# Patient Record
Sex: FEMALE | Race: WHITE | NOT HISPANIC OR LATINO | Employment: OTHER | URBAN - METROPOLITAN AREA
[De-identification: names, ages, dates, MRNs, and addresses within clinical notes are randomized per-mention and may not be internally consistent; named-entity substitution may affect disease eponyms.]

---

## 2017-03-27 ENCOUNTER — LAB REQUISITION (OUTPATIENT)
Dept: LAB | Facility: HOSPITAL | Age: 52
End: 2017-03-27

## 2017-03-27 DIAGNOSIS — L03.115 CELLULITIS OF RIGHT LOWER EXTREMITY: ICD-10-CM

## 2017-03-27 LAB
ALBUMIN SERPL-MCNC: 4.4 G/DL (ref 3.2–4.8)
ALBUMIN/GLOB SERPL: 1.6 G/DL (ref 1.5–2.5)
ALP SERPL-CCNC: 57 U/L (ref 25–100)
ALT SERPL W P-5'-P-CCNC: 22 U/L (ref 7–40)
ANION GAP SERPL CALCULATED.3IONS-SCNC: 5 MMOL/L (ref 3–11)
AST SERPL-CCNC: 24 U/L (ref 0–33)
BASOPHILS # BLD AUTO: 0.05 10*3/MM3 (ref 0–0.2)
BASOPHILS NFR BLD AUTO: 0.6 % (ref 0–1)
BILIRUB SERPL-MCNC: 0.4 MG/DL (ref 0.3–1.2)
BUN BLD-MCNC: 12 MG/DL (ref 9–23)
BUN/CREAT SERPL: 17.1 (ref 7–25)
CALCIUM SPEC-SCNC: 9.8 MG/DL (ref 8.7–10.4)
CHLORIDE SERPL-SCNC: 102 MMOL/L (ref 99–109)
CK SERPL-CCNC: 104 U/L (ref 26–174)
CO2 SERPL-SCNC: 31 MMOL/L (ref 20–31)
CREAT BLD-MCNC: 0.7 MG/DL (ref 0.6–1.3)
CRP SERPL-MCNC: 1.8 MG/DL (ref 0–10)
DEPRECATED RDW RBC AUTO: 43.4 FL (ref 37–54)
EOSINOPHIL # BLD AUTO: 0.07 10*3/MM3 (ref 0.1–0.3)
EOSINOPHIL NFR BLD AUTO: 0.8 % (ref 0–3)
ERYTHROCYTE [DISTWIDTH] IN BLOOD BY AUTOMATED COUNT: 12.7 % (ref 11.3–14.5)
ERYTHROCYTE [SEDIMENTATION RATE] IN BLOOD: 7 MM/HR (ref 0–30)
GFR SERPL CREATININE-BSD FRML MDRD: 87 ML/MIN/1.73
GLOBULIN UR ELPH-MCNC: 2.8 GM/DL
GLUCOSE BLD-MCNC: 100 MG/DL (ref 70–100)
HCT VFR BLD AUTO: 44.4 % (ref 34.5–44)
HGB BLD-MCNC: 14.8 G/DL (ref 11.5–15.5)
IMM GRANULOCYTES # BLD: 0.02 10*3/MM3 (ref 0–0.03)
IMM GRANULOCYTES NFR BLD: 0.2 % (ref 0–0.6)
LYMPHOCYTES # BLD AUTO: 2.81 10*3/MM3 (ref 0.6–4.8)
LYMPHOCYTES NFR BLD AUTO: 32.9 % (ref 24–44)
MCH RBC QN AUTO: 31.2 PG (ref 27–31)
MCHC RBC AUTO-ENTMCNC: 33.3 G/DL (ref 32–36)
MCV RBC AUTO: 93.5 FL (ref 80–99)
MONOCYTES # BLD AUTO: 0.89 10*3/MM3 (ref 0–1)
MONOCYTES NFR BLD AUTO: 10.4 % (ref 0–12)
NEUTROPHILS # BLD AUTO: 4.69 10*3/MM3 (ref 1.5–8.3)
NEUTROPHILS NFR BLD AUTO: 55.1 % (ref 41–71)
PLATELET # BLD AUTO: 322 10*3/MM3 (ref 150–450)
PMV BLD AUTO: 9.7 FL (ref 6–12)
POTASSIUM BLD-SCNC: 3.1 MMOL/L (ref 3.5–5.5)
PROT SERPL-MCNC: 7.2 G/DL (ref 5.7–8.2)
RBC # BLD AUTO: 4.75 10*6/MM3 (ref 3.89–5.14)
SODIUM BLD-SCNC: 138 MMOL/L (ref 132–146)
WBC NRBC COR # BLD: 8.53 10*3/MM3 (ref 3.5–10.8)

## 2017-03-27 PROCEDURE — 87070 CULTURE OTHR SPECIMN AEROBIC: CPT | Performed by: INTERNAL MEDICINE

## 2017-03-27 PROCEDURE — 80053 COMPREHEN METABOLIC PANEL: CPT | Performed by: INTERNAL MEDICINE

## 2017-03-27 PROCEDURE — 85652 RBC SED RATE AUTOMATED: CPT | Performed by: INTERNAL MEDICINE

## 2017-03-27 PROCEDURE — 87205 SMEAR GRAM STAIN: CPT | Performed by: INTERNAL MEDICINE

## 2017-03-27 PROCEDURE — 86140 C-REACTIVE PROTEIN: CPT | Performed by: INTERNAL MEDICINE

## 2017-03-27 PROCEDURE — 82550 ASSAY OF CK (CPK): CPT | Performed by: INTERNAL MEDICINE

## 2017-03-27 PROCEDURE — 85025 COMPLETE CBC W/AUTO DIFF WBC: CPT | Performed by: INTERNAL MEDICINE

## 2017-03-28 ENCOUNTER — TRANSCRIBE ORDERS (OUTPATIENT)
Dept: ADMINISTRATIVE | Facility: HOSPITAL | Age: 52
End: 2017-03-28

## 2017-03-28 DIAGNOSIS — L03.115 CELLULITIS OF RIGHT LEG: Primary | ICD-10-CM

## 2017-03-29 LAB
BACTERIA SPEC AEROBE CULT: NORMAL
GRAM STN SPEC: NORMAL

## 2017-03-30 ENCOUNTER — TRANSCRIBE ORDERS (OUTPATIENT)
Dept: LAB | Facility: HOSPITAL | Age: 52
End: 2017-03-30

## 2017-03-30 ENCOUNTER — LAB (OUTPATIENT)
Dept: LAB | Facility: HOSPITAL | Age: 52
End: 2017-03-30
Attending: INTERNAL MEDICINE

## 2017-03-30 ENCOUNTER — TRANSCRIBE ORDERS (OUTPATIENT)
Dept: PHYSICAL THERAPY | Facility: HOSPITAL | Age: 52
End: 2017-03-30

## 2017-03-30 DIAGNOSIS — R19.7 DIARRHEA, UNSPECIFIED TYPE: Primary | ICD-10-CM

## 2017-03-30 DIAGNOSIS — R19.7 DIARRHEA, UNSPECIFIED TYPE: ICD-10-CM

## 2017-03-30 DIAGNOSIS — L03.115 CELLULITIS OF RIGHT LEG: Primary | ICD-10-CM

## 2017-03-30 LAB — C DIFF TOX GENS STL QL NAA+PROBE: NOT DETECTED

## 2017-03-30 PROCEDURE — 87493 C DIFF AMPLIFIED PROBE: CPT | Performed by: INTERNAL MEDICINE

## 2017-04-03 ENCOUNTER — TRANSCRIBE ORDERS (OUTPATIENT)
Dept: LAB | Facility: HOSPITAL | Age: 52
End: 2017-04-03

## 2017-04-03 ENCOUNTER — LAB (OUTPATIENT)
Dept: LAB | Facility: HOSPITAL | Age: 52
End: 2017-04-03

## 2017-04-03 DIAGNOSIS — L03.115 CELLULITIS OF RIGHT FOOT: Primary | ICD-10-CM

## 2017-04-03 DIAGNOSIS — L03.115 CELLULITIS OF RIGHT FOOT: ICD-10-CM

## 2017-04-03 LAB
ALBUMIN SERPL-MCNC: 4.2 G/DL (ref 3.2–4.8)
ALBUMIN/GLOB SERPL: 1.6 G/DL (ref 1.5–2.5)
ALP SERPL-CCNC: 65 U/L (ref 25–100)
ALT SERPL W P-5'-P-CCNC: 25 U/L (ref 7–40)
ANION GAP SERPL CALCULATED.3IONS-SCNC: 7 MMOL/L (ref 3–11)
AST SERPL-CCNC: 21 U/L (ref 0–33)
BASOPHILS # BLD AUTO: 0.05 10*3/MM3 (ref 0–0.2)
BASOPHILS NFR BLD AUTO: 0.7 % (ref 0–1)
BILIRUB SERPL-MCNC: 0.3 MG/DL (ref 0.3–1.2)
BUN BLD-MCNC: 16 MG/DL (ref 9–23)
BUN/CREAT SERPL: 26.7 (ref 7–25)
CALCIUM SPEC-SCNC: 9.8 MG/DL (ref 8.7–10.4)
CHLORIDE SERPL-SCNC: 105 MMOL/L (ref 99–109)
CK SERPL-CCNC: 145 U/L (ref 26–174)
CO2 SERPL-SCNC: 34 MMOL/L (ref 20–31)
CREAT BLD-MCNC: 0.6 MG/DL (ref 0.6–1.3)
CRP SERPL-MCNC: 0.21 MG/DL (ref 0–1)
DEPRECATED RDW RBC AUTO: 43.9 FL (ref 37–54)
EOSINOPHIL # BLD AUTO: 0.09 10*3/MM3 (ref 0.1–0.3)
EOSINOPHIL NFR BLD AUTO: 1.3 % (ref 0–3)
ERYTHROCYTE [DISTWIDTH] IN BLOOD BY AUTOMATED COUNT: 12.7 % (ref 11.3–14.5)
ERYTHROCYTE [SEDIMENTATION RATE] IN BLOOD: 10 MM/HR (ref 0–30)
GFR SERPL CREATININE-BSD FRML MDRD: 104 ML/MIN/1.73
GLOBULIN UR ELPH-MCNC: 2.7 GM/DL
GLUCOSE BLD-MCNC: 81 MG/DL (ref 70–100)
HCT VFR BLD AUTO: 45.4 % (ref 34.5–44)
HGB BLD-MCNC: 14.9 G/DL (ref 11.5–15.5)
IMM GRANULOCYTES # BLD: 0.03 10*3/MM3 (ref 0–0.03)
IMM GRANULOCYTES NFR BLD: 0.4 % (ref 0–0.6)
LYMPHOCYTES # BLD AUTO: 2.33 10*3/MM3 (ref 0.6–4.8)
LYMPHOCYTES NFR BLD AUTO: 33 % (ref 24–44)
MCH RBC QN AUTO: 31 PG (ref 27–31)
MCHC RBC AUTO-ENTMCNC: 32.8 G/DL (ref 32–36)
MCV RBC AUTO: 94.4 FL (ref 80–99)
MONOCYTES # BLD AUTO: 0.85 10*3/MM3 (ref 0–1)
MONOCYTES NFR BLD AUTO: 12 % (ref 0–12)
NEUTROPHILS # BLD AUTO: 3.72 10*3/MM3 (ref 1.5–8.3)
NEUTROPHILS NFR BLD AUTO: 52.6 % (ref 41–71)
PLATELET # BLD AUTO: 282 10*3/MM3 (ref 150–450)
PMV BLD AUTO: 9 FL (ref 6–12)
POTASSIUM BLD-SCNC: 3.8 MMOL/L (ref 3.5–5.5)
PROT SERPL-MCNC: 6.9 G/DL (ref 5.7–8.2)
RBC # BLD AUTO: 4.81 10*6/MM3 (ref 3.89–5.14)
SODIUM BLD-SCNC: 146 MMOL/L (ref 132–146)
WBC NRBC COR # BLD: 7.07 10*3/MM3 (ref 3.5–10.8)

## 2017-04-03 PROCEDURE — 82550 ASSAY OF CK (CPK): CPT | Performed by: INTERNAL MEDICINE

## 2017-04-03 PROCEDURE — 85025 COMPLETE CBC W/AUTO DIFF WBC: CPT | Performed by: INTERNAL MEDICINE

## 2017-04-03 PROCEDURE — 85652 RBC SED RATE AUTOMATED: CPT | Performed by: INTERNAL MEDICINE

## 2017-04-03 PROCEDURE — 86140 C-REACTIVE PROTEIN: CPT | Performed by: INTERNAL MEDICINE

## 2017-04-03 PROCEDURE — 80053 COMPREHEN METABOLIC PANEL: CPT | Performed by: INTERNAL MEDICINE

## 2017-04-03 PROCEDURE — 36415 COLL VENOUS BLD VENIPUNCTURE: CPT

## 2017-04-04 ENCOUNTER — APPOINTMENT (OUTPATIENT)
Dept: PHYSICAL THERAPY | Facility: HOSPITAL | Age: 52
End: 2017-04-04

## 2017-04-05 ENCOUNTER — APPOINTMENT (OUTPATIENT)
Dept: CT IMAGING | Facility: HOSPITAL | Age: 52
End: 2017-04-05

## 2017-04-05 ENCOUNTER — HOSPITAL ENCOUNTER (EMERGENCY)
Facility: HOSPITAL | Age: 52
Discharge: HOME | End: 2017-04-05
Attending: EMERGENCY MEDICINE | Admitting: EMERGENCY MEDICINE

## 2017-04-05 ENCOUNTER — APPOINTMENT (OUTPATIENT)
Dept: GENERAL RADIOLOGY | Facility: HOSPITAL | Age: 52
End: 2017-04-05

## 2017-04-05 VITALS
HEIGHT: 65 IN | OXYGEN SATURATION: 96 % | BODY MASS INDEX: 22.99 KG/M2 | HEART RATE: 79 BPM | SYSTOLIC BLOOD PRESSURE: 127 MMHG | DIASTOLIC BLOOD PRESSURE: 80 MMHG | RESPIRATION RATE: 16 BRPM | TEMPERATURE: 97.9 F | WEIGHT: 138 LBS

## 2017-04-05 DIAGNOSIS — J44.1 COPD EXACERBATION (HCC): ICD-10-CM

## 2017-04-05 DIAGNOSIS — R06.00 DYSPNEA, UNSPECIFIED TYPE: Primary | ICD-10-CM

## 2017-04-05 LAB
ALBUMIN SERPL-MCNC: 4.2 G/DL (ref 3.2–4.8)
ALBUMIN/GLOB SERPL: 1.4 G/DL (ref 1.5–2.5)
ALP SERPL-CCNC: 64 U/L (ref 25–100)
ALT SERPL W P-5'-P-CCNC: 23 U/L (ref 7–40)
ANION GAP SERPL CALCULATED.3IONS-SCNC: 5 MMOL/L (ref 3–11)
AST SERPL-CCNC: 23 U/L (ref 0–33)
BASOPHILS # BLD AUTO: 0.06 10*3/MM3 (ref 0–0.2)
BASOPHILS NFR BLD AUTO: 0.7 % (ref 0–1)
BILIRUB SERPL-MCNC: 0.3 MG/DL (ref 0.3–1.2)
BNP SERPL-MCNC: 26 PG/ML (ref 0–100)
BUN BLD-MCNC: 11 MG/DL (ref 9–23)
BUN/CREAT SERPL: 18.3 (ref 7–25)
CALCIUM SPEC-SCNC: 10.1 MG/DL (ref 8.7–10.4)
CHLORIDE SERPL-SCNC: 101 MMOL/L (ref 99–109)
CO2 SERPL-SCNC: 34 MMOL/L (ref 20–31)
CREAT BLD-MCNC: 0.6 MG/DL (ref 0.6–1.3)
DEPRECATED RDW RBC AUTO: 43 FL (ref 37–54)
EOSINOPHIL # BLD AUTO: 0.1 10*3/MM3 (ref 0.1–0.3)
EOSINOPHIL NFR BLD AUTO: 1.1 % (ref 0–3)
ERYTHROCYTE [DISTWIDTH] IN BLOOD BY AUTOMATED COUNT: 12.5 % (ref 11.3–14.5)
GFR SERPL CREATININE-BSD FRML MDRD: 104 ML/MIN/1.73
GLOBULIN UR ELPH-MCNC: 2.9 GM/DL
GLUCOSE BLD-MCNC: 72 MG/DL (ref 70–100)
HCT VFR BLD AUTO: 45.3 % (ref 34.5–44)
HGB BLD-MCNC: 15.3 G/DL (ref 11.5–15.5)
HOLD SPECIMEN: NORMAL
HOLD SPECIMEN: NORMAL
IMM GRANULOCYTES # BLD: 0.03 10*3/MM3 (ref 0–0.03)
IMM GRANULOCYTES NFR BLD: 0.3 % (ref 0–0.6)
LIPASE SERPL-CCNC: 36 U/L (ref 6–51)
LYMPHOCYTES # BLD AUTO: 2.93 10*3/MM3 (ref 0.6–4.8)
LYMPHOCYTES NFR BLD AUTO: 33.1 % (ref 24–44)
MCH RBC QN AUTO: 31.7 PG (ref 27–31)
MCHC RBC AUTO-ENTMCNC: 33.8 G/DL (ref 32–36)
MCV RBC AUTO: 93.8 FL (ref 80–99)
MONOCYTES # BLD AUTO: 1.13 10*3/MM3 (ref 0–1)
MONOCYTES NFR BLD AUTO: 12.8 % (ref 0–12)
NEUTROPHILS # BLD AUTO: 4.61 10*3/MM3 (ref 1.5–8.3)
NEUTROPHILS NFR BLD AUTO: 52 % (ref 41–71)
NRBC BLD MANUAL-RTO: 0 /100 WBC (ref 0–0)
PLATELET # BLD AUTO: 306 10*3/MM3 (ref 150–450)
PMV BLD AUTO: 8.8 FL (ref 6–12)
POTASSIUM BLD-SCNC: 3.3 MMOL/L (ref 3.5–5.5)
PROT SERPL-MCNC: 7.1 G/DL (ref 5.7–8.2)
RBC # BLD AUTO: 4.83 10*6/MM3 (ref 3.89–5.14)
SODIUM BLD-SCNC: 140 MMOL/L (ref 132–146)
TROPONIN I SERPL-MCNC: 0 NG/ML (ref 0–0.07)
TROPONIN I SERPL-MCNC: 0 NG/ML (ref 0–0.07)
WBC NRBC COR # BLD: 8.86 10*3/MM3 (ref 3.5–10.8)
WHOLE BLOOD HOLD SPECIMEN: NORMAL
WHOLE BLOOD HOLD SPECIMEN: NORMAL

## 2017-04-05 PROCEDURE — 96374 THER/PROPH/DIAG INJ IV PUSH: CPT

## 2017-04-05 PROCEDURE — 93005 ELECTROCARDIOGRAM TRACING: CPT | Performed by: EMERGENCY MEDICINE

## 2017-04-05 PROCEDURE — 99284 EMERGENCY DEPT VISIT MOD MDM: CPT

## 2017-04-05 PROCEDURE — 85025 COMPLETE CBC W/AUTO DIFF WBC: CPT | Performed by: EMERGENCY MEDICINE

## 2017-04-05 PROCEDURE — 83690 ASSAY OF LIPASE: CPT | Performed by: EMERGENCY MEDICINE

## 2017-04-05 PROCEDURE — 0 IOPAMIDOL PER 1 ML: Performed by: EMERGENCY MEDICINE

## 2017-04-05 PROCEDURE — 80053 COMPREHEN METABOLIC PANEL: CPT | Performed by: EMERGENCY MEDICINE

## 2017-04-05 PROCEDURE — 83880 ASSAY OF NATRIURETIC PEPTIDE: CPT | Performed by: EMERGENCY MEDICINE

## 2017-04-05 PROCEDURE — 71275 CT ANGIOGRAPHY CHEST: CPT

## 2017-04-05 PROCEDURE — 94640 AIRWAY INHALATION TREATMENT: CPT

## 2017-04-05 PROCEDURE — 93005 ELECTROCARDIOGRAM TRACING: CPT

## 2017-04-05 PROCEDURE — 84484 ASSAY OF TROPONIN QUANT: CPT

## 2017-04-05 PROCEDURE — 36415 COLL VENOUS BLD VENIPUNCTURE: CPT

## 2017-04-05 PROCEDURE — 71010 HC CHEST PA OR AP: CPT

## 2017-04-05 PROCEDURE — 25010000002 METHYLPREDNISOLONE PER 125 MG: Performed by: EMERGENCY MEDICINE

## 2017-04-05 RX ORDER — TRAMADOL HYDROCHLORIDE 50 MG/1
50 TABLET ORAL EVERY 6 HOURS PRN
COMMUNITY

## 2017-04-05 RX ORDER — METHYLPREDNISOLONE SODIUM SUCCINATE 125 MG/2ML
125 INJECTION, POWDER, LYOPHILIZED, FOR SOLUTION INTRAMUSCULAR; INTRAVENOUS ONCE
Status: COMPLETED | OUTPATIENT
Start: 2017-04-05 | End: 2017-04-05

## 2017-04-05 RX ORDER — ALBUTEROL SULFATE 2.5 MG/3ML
2.5 SOLUTION RESPIRATORY (INHALATION) ONCE
Status: COMPLETED | OUTPATIENT
Start: 2017-04-05 | End: 2017-04-05

## 2017-04-05 RX ORDER — SODIUM CHLORIDE 0.9 % (FLUSH) 0.9 %
10 SYRINGE (ML) INJECTION AS NEEDED
Status: DISCONTINUED | OUTPATIENT
Start: 2017-04-05 | End: 2017-04-05 | Stop reason: HOSPADM

## 2017-04-05 RX ORDER — ASPIRIN 81 MG/1
324 TABLET, CHEWABLE ORAL ONCE
Status: DISCONTINUED | OUTPATIENT
Start: 2017-04-05 | End: 2017-04-05 | Stop reason: HOSPADM

## 2017-04-05 RX ADMIN — IPRATROPIUM BROMIDE 0.5 MG: 0.5 SOLUTION RESPIRATORY (INHALATION) at 10:58

## 2017-04-05 RX ADMIN — METHYLPREDNISOLONE SODIUM SUCCINATE 125 MG: 125 INJECTION, POWDER, FOR SOLUTION INTRAMUSCULAR; INTRAVENOUS at 10:37

## 2017-04-05 RX ADMIN — ALBUTEROL SULFATE 2.5 MG: 2.5 SOLUTION RESPIRATORY (INHALATION) at 10:58

## 2017-04-05 RX ADMIN — IOPAMIDOL 95 ML: 755 INJECTION, SOLUTION INTRAVENOUS at 11:40

## 2017-04-05 NOTE — ED PROVIDER NOTES
Subjective   HPI Comments: 52-year-old white female complaining of anterior chest pain and worsening shortness of air for 1 day.  Patient has a history of COPD currently on 2 L of nasal cannula oxygen.  She states that she woke last night with anterior chest pressure and shortness of air that is different from what she has had in the past.  Of note, patient is currently receiving antibiotics for a right leg infection.  She denies any fever, vomiting, abdominal pain, or other concerns.    Patient is a 52 y.o. female presenting with shortness of breath.   History provided by:  Patient and relative  Shortness of Breath   Severity:  Moderate  Onset quality:  Gradual  Timing:  Constant  Progression:  Worsening  Chronicity:  New  Relieved by:  Nothing  Worsened by:  Nothing  Ineffective treatments:  None tried  Associated symptoms: chest pain, cough and wheezing    Associated symptoms: no abdominal pain, no fever, no rash, no syncope and no vomiting        Review of Systems   Constitutional: Negative for fever.   Respiratory: Positive for cough, shortness of breath and wheezing.    Cardiovascular: Positive for chest pain. Negative for syncope.   Gastrointestinal: Negative for abdominal pain and vomiting.   Skin: Negative for rash.   All other systems reviewed and are negative.      Past Medical History:   Diagnosis Date   • Anxiety    • Asthma    • Bleeding disorder     pt reports that she bleeds easily   • COPD (chronic obstructive pulmonary disease)     reports 23% lung function; oxygen dependent for last 5 years   • GERD (gastroesophageal reflux disease)    • Heart palpitations     reports palpitations when she becomes short of breath   • Hx of migraines     reports that she no longer has migraines s/p ear piercing   • Hypertension    • Infection     right lower extremity s/p injury    • Open wound     right lower extremity   • Oxygen dependent 2012   • Panic attack        Allergies   Allergen Reactions   •  Ciprofloxacin Anaphylaxis   • Biaxin [Clarithromycin] Rash   • Clindamycin/Lincomycin Rash   • Micardis [Telmisartan] Rash   • Penicillins Rash   • Sulfa Antibiotics Rash       Past Surgical History:   Procedure Laterality Date   • CHOLECYSTECTOMY     • COLON SURGERY      12 inches of colon removed   • HYSTERECTOMY         History reviewed. No pertinent family history.    Social History     Social History   • Marital status:      Spouse name: N/A   • Number of children: N/A   • Years of education: N/A     Social History Main Topics   • Smoking status: Former Smoker   • Smokeless tobacco: None   • Alcohol use No   • Drug use: No   • Sexual activity: Defer     Other Topics Concern   • None     Social History Narrative    Lives with her            Objective   Physical Exam   Constitutional: She appears well-developed and well-nourished.   HENT:   Head: Normocephalic and atraumatic.   Eyes: Conjunctivae are normal.   Cardiovascular: Normal rate, regular rhythm and normal heart sounds.    Pulmonary/Chest: Effort normal. She has rales.   Abdominal: Soft. Bowel sounds are normal. There is no tenderness.   Musculoskeletal: Normal range of motion. She exhibits edema.   Neurological: She is alert.   Skin: Skin is warm and dry.   Psychiatric: She has a normal mood and affect. Her behavior is normal. Judgment and thought content normal.   Nursing note and vitals reviewed.      Procedures         ED Course  ED Course   Comment By Time   Pt with no other complaints or concerns. She agrees with the plan GALA Sibley 04/05 1447            HEART Score  History: Slightly suspicious (+0)  ECG: Normal (+0)  Age: 45 through 65 (+1)  Risk Factors: 1 - 2 risk factors (+1)  Troponin: Normal limit or lower (+0)  Total: 2       Recent Results (from the past 24 hour(s))   Comprehensive Metabolic Panel    Collection Time: 04/05/17  9:49 AM   Result Value Ref Range    Glucose 72 70 - 100 mg/dL    BUN 11 9 - 23 mg/dL     Creatinine 0.60 0.60 - 1.30 mg/dL    Sodium 140 132 - 146 mmol/L    Potassium 3.3 (L) 3.5 - 5.5 mmol/L    Chloride 101 99 - 109 mmol/L    CO2 34.0 (H) 20.0 - 31.0 mmol/L    Calcium 10.1 8.7 - 10.4 mg/dL    Total Protein 7.1 5.7 - 8.2 g/dL    Albumin 4.20 3.20 - 4.80 g/dL    ALT (SGPT) 23 7 - 40 U/L    AST (SGOT) 23 0 - 33 U/L    Alkaline Phosphatase 64 25 - 100 U/L    Total Bilirubin 0.3 0.3 - 1.2 mg/dL    eGFR Non African Amer 104 >60 mL/min/1.73    Globulin 2.9 gm/dL    A/G Ratio 1.4 (L) 1.5 - 2.5 g/dL    BUN/Creatinine Ratio 18.3 7.0 - 25.0    Anion Gap 5.0 3.0 - 11.0 mmol/L   Lipase    Collection Time: 04/05/17  9:49 AM   Result Value Ref Range    Lipase 36 6 - 51 U/L   BNP    Collection Time: 04/05/17  9:49 AM   Result Value Ref Range    BNP 26.0 0.0 - 100.0 pg/mL   Light Blue Top    Collection Time: 04/05/17  9:49 AM   Result Value Ref Range    Extra Tube hold for add-on    Green Top (Gel)    Collection Time: 04/05/17  9:49 AM   Result Value Ref Range    Extra Tube Hold for add-ons.    Lavender Top    Collection Time: 04/05/17  9:49 AM   Result Value Ref Range    Extra Tube hold for add-on    Gold Top - SST    Collection Time: 04/05/17  9:49 AM   Result Value Ref Range    Extra Tube Hold for add-ons.    CBC Auto Differential    Collection Time: 04/05/17  9:49 AM   Result Value Ref Range    WBC 8.86 3.50 - 10.80 10*3/mm3    RBC 4.83 3.89 - 5.14 10*6/mm3    Hemoglobin 15.3 11.5 - 15.5 g/dL    Hematocrit 45.3 (H) 34.5 - 44.0 %    MCV 93.8 80.0 - 99.0 fL    MCH 31.7 (H) 27.0 - 31.0 pg    MCHC 33.8 32.0 - 36.0 g/dL    RDW 12.5 11.3 - 14.5 %    RDW-SD 43.0 37.0 - 54.0 fl    MPV 8.8 6.0 - 12.0 fL    Platelets 306 150 - 450 10*3/mm3    Neutrophil % 52.0 41.0 - 71.0 %    Lymphocyte % 33.1 24.0 - 44.0 %    Monocyte % 12.8 (H) 0.0 - 12.0 %    Eosinophil % 1.1 0.0 - 3.0 %    Basophil % 0.7 0.0 - 1.0 %    Immature Grans % 0.3 0.0 - 0.6 %    Neutrophils, Absolute 4.61 1.50 - 8.30 10*3/mm3    Lymphocytes, Absolute 2.93 0.60  - 4.80 10*3/mm3    Monocytes, Absolute 1.13 (H) 0.00 - 1.00 10*3/mm3    Eosinophils, Absolute 0.10 0.10 - 0.30 10*3/mm3    Basophils, Absolute 0.06 0.00 - 0.20 10*3/mm3    Immature Grans, Absolute 0.03 0.00 - 0.03 10*3/mm3    nRBC 0.0 0.0 - 0.0 /100 WBC   POC Troponin, Rapid    Collection Time: 04/05/17  9:54 AM   Result Value Ref Range    Troponin I 0.00 0.00 - 0.07 ng/mL   POC Troponin, Rapid    Collection Time: 04/05/17 12:17 PM   Result Value Ref Range    Troponin I 0.00 0.00 - 0.07 ng/mL     Note: In addition to lab results from this visit, the labs listed above may include labs taken at another facility or during a different encounter within the last 24 hours. Please correlate lab times with ED admission and discharge times for further clarification of the services performed during this visit.    XR Chest 1 View   Preliminary Result   PICC line tip in the distal SVC in good position. Chronic   appearing changes of COPD. No acute chest pathology is identified.       D:  04/05/2017   E:  04/05/2017              CT Angiogram Chest With & Without Contrast   Preliminary Result   1. No evidence of pulmonary embolus or other acute chest disease.   2. Extensive upper lobe changes of centrilobular emphysema.   3. [place] appearing 4 mm noncalcified left lower lobe nodule. By lung   RADS criteria, 12 month follow-up imaging is suggested to evaluate for   stability.       D:  04/05/2017   E:  04/05/2017                Vitals:    04/05/17 1228 04/05/17 1323 04/05/17 1415 04/05/17 1445   BP: 127/80      BP Location:       Patient Position:       Pulse: 77 70 73 79   Resp:       Temp:       TempSrc:       SpO2: 98% 97% 97% 96%   Weight:       Height:         Medications   methylPREDNISolone sodium succinate (SOLU-Medrol) injection 125 mg (125 mg Intravenous Given 4/5/17 1037)   albuterol (PROVENTIL) nebulizer solution 0.083% 2.5 mg/3mL (2.5 mg Nebulization Given 4/5/17 1058)   ipratropium (ATROVENT) nebulizer solution 0.5 mg  (0.5 mg Nebulization Given 4/5/17 1058)   iopamidol (ISOVUE-370) 76 % injection 100 mL (95 mL Intravenous Given 4/5/17 1140)     ECG/EMG Results (last 24 hours)     Procedure Component Value Units Date/Time    ECG 12 Lead [[id number]] Collected:  04/05/17 0943     Updated:  04/05/17 1021    ECG 12 Lead [[id number]] Collected:  04/05/17 1208     Updated:  04/05/17 1222          MDM    Final diagnoses:   Dyspnea, unspecified type   COPD exacerbation            GALA Sibley  04/05/17 1450       GALA Sibley  04/05/17 1678

## 2017-04-05 NOTE — DISCHARGE INSTRUCTIONS
Follow up with your doctor tomorrow. Also follow up with your pulmonologist. Continue to use your breathing treatments as needed.  You will need a follow up CT for the nodule on your lung in 12 months.